# Patient Record
Sex: MALE | Race: BLACK OR AFRICAN AMERICAN | NOT HISPANIC OR LATINO | Employment: UNEMPLOYED | URBAN - METROPOLITAN AREA
[De-identification: names, ages, dates, MRNs, and addresses within clinical notes are randomized per-mention and may not be internally consistent; named-entity substitution may affect disease eponyms.]

---

## 2018-03-12 ENCOUNTER — OFFICE VISIT (OUTPATIENT)
Dept: INTERNAL MEDICINE | Facility: CLINIC | Age: 53
End: 2018-03-12
Payer: COMMERCIAL

## 2018-03-12 ENCOUNTER — LAB VISIT (OUTPATIENT)
Dept: LAB | Facility: HOSPITAL | Age: 53
End: 2018-03-12
Attending: PHYSICIAN ASSISTANT

## 2018-03-12 VITALS
TEMPERATURE: 98 F | BODY MASS INDEX: 29.74 KG/M2 | SYSTOLIC BLOOD PRESSURE: 114 MMHG | OXYGEN SATURATION: 95 % | HEART RATE: 87 BPM | RESPIRATION RATE: 16 BRPM | HEIGHT: 69 IN | DIASTOLIC BLOOD PRESSURE: 72 MMHG | WEIGHT: 200.81 LBS

## 2018-03-12 DIAGNOSIS — Z00.00 ENCOUNTER FOR ANNUAL PHYSICAL EXAM: ICD-10-CM

## 2018-03-12 DIAGNOSIS — J01.00 ACUTE NON-RECURRENT MAXILLARY SINUSITIS: Primary | ICD-10-CM

## 2018-03-12 DIAGNOSIS — R04.0 RIGHT-SIDED EPISTAXIS: ICD-10-CM

## 2018-03-12 PROBLEM — E66.9 OBESITY, CLASS I, BMI 30-34.9: Status: ACTIVE | Noted: 2018-03-12

## 2018-03-12 LAB — COMPLEXED PSA SERPL-MCNC: 0.45 NG/ML

## 2018-03-12 PROCEDURE — 84153 ASSAY OF PSA TOTAL: CPT

## 2018-03-12 PROCEDURE — 99202 OFFICE O/P NEW SF 15 MIN: CPT | Mod: S$GLB,,, | Performed by: PHYSICIAN ASSISTANT

## 2018-03-12 PROCEDURE — 99999 PR PBB SHADOW E&M-NEW PATIENT-LVL III: CPT | Mod: PBBFAC,,, | Performed by: PHYSICIAN ASSISTANT

## 2018-03-12 PROCEDURE — 36415 COLL VENOUS BLD VENIPUNCTURE: CPT | Mod: PO

## 2018-03-12 RX ORDER — CEPHALEXIN 500 MG/1
500 CAPSULE ORAL 2 TIMES DAILY
Qty: 14 CAPSULE | Refills: 0 | Status: SHIPPED | OUTPATIENT
Start: 2018-03-12 | End: 2018-03-19

## 2018-03-12 RX ORDER — MELOXICAM 7.5 MG/1
7.5 TABLET ORAL DAILY
COMMUNITY

## 2018-03-12 RX ORDER — ACETAMINOPHEN 500 MG
500 TABLET ORAL EVERY 6 HOURS PRN
COMMUNITY

## 2018-03-12 NOTE — PROGRESS NOTES
Subjective:       Patient ID: Alberto Rodríguez is a 52 y.o.B/ male.    Chief Complaint: Follow-up    HPI         He comes in today accompanied by his new wife Nelli Lawton and has the above need.  He has not ever had a colonoscopy done and needs to have that scheduled.  He also needs to have his prostate checked for cancer.        He currently has some problems with nasal congestion and drainage.  He is sneezing a lot.  When he sneezes or when he blows his nose some blood will come from the right side of his nose but it is not a continuous flow.  He also has lost his sense of smell a little bit but not his sense of taste.  He's had a little pressure behind his nose and his bridge of his nose.  He doesn't complain of any problems with earache, sore throat, fever, or tender glands in his neck.  He also does not have any cough.  There has been no problems passing his urine and when he finishes he doesn't feel like he's not emptied his bladder completely.  There has been no change in the size or force of his stream.  He also has had no dysuria, pyuria, nocturia or urethral discharge.        He has been taken Mucinex products and also using a nasal spray called Sinex and putting some Vaseline inside his nose.  He's really not taking an antihistamine or nasal decongestant.    Review of Systems    Otherwise negative concerning the RESPIRATORY, PULMONARY, CV, VASCULAR, RENAL, GI, , INTEGUMENT, and neurologic system review.    Objective:      Physical Exam   EARS: TMs are transparent and clear with no fluid posterior.  And His canals do not show any redness or swelling.  Normal amount of wax is present.  NOSE: He is highly inflamed at Kiesselbach's plexus on the right side and also to a lesser extent on the left side.  There is some signs of all bleeding present on Kiesselbach's Plexus.  There is no redness to the left side of the turbinates.  There is no rhinorrhea or mucopurulence present.  THROAT: Appears normal without any  swelling or redness to the posterior pharynx or the soft palate.  His uvula is elongated a little bit and touching his tongue but is not hyperemic.  He doesn't have any exudates or halitosis present.  Glands are not tender or swollen in the cervical spine.  CHEST: Clear BS anterior to posterior with no wheeze, rhonchi, or rales.    Assessment:       1. Acute non-recurrent maxillary sinusitis    2. Right-sided epistaxis    3. Encounter for annual physical exam        Plan:     1.  Stop the Sinex and start one of the steroid inhalers.  He was given proper instruction on how to use this product.  2.  Also take antihistamine of choice.  3.  Start him on Keflex 500 mg twice a day ×7 days.  Recheck in 12 days a symptoms persist or increase.  4.  Schedule him a screening PSA and also a screening colonoscopy.  Follow-up after these tests are done.

## 2018-03-15 ENCOUNTER — DOCUMENTATION ONLY (OUTPATIENT)
Dept: ENDOSCOPY | Facility: HOSPITAL | Age: 53
End: 2018-03-15